# Patient Record
Sex: MALE | Race: WHITE | Employment: OTHER
[De-identification: names, ages, dates, MRNs, and addresses within clinical notes are randomized per-mention and may not be internally consistent; named-entity substitution may affect disease eponyms.]

---

## 2017-08-16 PROBLEM — I10 ESSENTIAL HYPERTENSION: Status: ACTIVE | Noted: 2017-08-16

## 2017-08-16 PROBLEM — E78.00 HYPERCHOLESTEREMIA: Status: ACTIVE | Noted: 2017-08-16

## 2019-11-11 PROBLEM — C44.310 BASAL CELL CARCINOMA (BCC) OF SKIN OF FACE: Status: ACTIVE | Noted: 2019-09-05

## 2019-11-11 PROBLEM — K21.9 GERD (GASTROESOPHAGEAL REFLUX DISEASE): Status: ACTIVE | Noted: 2019-09-05

## 2019-11-11 PROBLEM — E78.2 MIXED HYPERLIPIDEMIA: Status: ACTIVE | Noted: 2017-08-16

## 2020-11-17 PROBLEM — M17.10 ARTHRITIS OF KNEE: Status: ACTIVE | Noted: 2020-03-13

## 2020-11-17 PROBLEM — E66.01 MORBID OBESITY WITH BMI OF 45.0-49.9, ADULT (HCC): Status: ACTIVE | Noted: 2019-06-04

## 2023-06-07 ENCOUNTER — HOSPITAL ENCOUNTER (OUTPATIENT)
Facility: HOSPITAL | Age: 68
Discharge: HOME | End: 2023-06-10
Attending: OTOLARYNGOLOGY
Payer: MEDICARE

## 2023-06-07 DIAGNOSIS — K21.9 GASTROESOPHAGEAL REFLUX DISEASE WITHOUT ESOPHAGITIS: ICD-10-CM

## 2023-06-07 PROCEDURE — 74220 X-RAY XM ESOPHAGUS 1CNTRST: CPT

## 2024-03-19 ENCOUNTER — TRANSCRIBE ORDERS (OUTPATIENT)
Facility: HOSPITAL | Age: 69
End: 2024-03-19

## 2024-03-19 DIAGNOSIS — M54.16 LUMBAR RADICULOPATHY: Primary | ICD-10-CM

## 2024-04-01 ENCOUNTER — HOSPITAL ENCOUNTER (OUTPATIENT)
Facility: HOSPITAL | Age: 69
Discharge: HOME | End: 2024-04-04
Payer: MEDICARE

## 2024-04-01 DIAGNOSIS — M54.16 LUMBAR RADICULOPATHY: ICD-10-CM

## 2024-04-01 PROCEDURE — 72148 MRI LUMBAR SPINE W/O DYE: CPT

## 2024-07-10 ENCOUNTER — HOSPITAL ENCOUNTER (OUTPATIENT)
Facility: HOSPITAL | Age: 69
Discharge: HOME | End: 2024-07-13
Payer: COMMERCIAL

## 2024-07-10 VITALS
DIASTOLIC BLOOD PRESSURE: 73 MMHG | RESPIRATION RATE: 16 BRPM | OXYGEN SATURATION: 99 % | BODY MASS INDEX: 37.74 KG/M2 | WEIGHT: 249 LBS | HEART RATE: 70 BPM | SYSTOLIC BLOOD PRESSURE: 111 MMHG | HEIGHT: 68 IN

## 2024-07-10 PROBLEM — Z01.818 ENCOUNTER FOR PREADMISSION TESTING: Status: ACTIVE | Noted: 2024-07-10

## 2024-07-10 LAB
ALBUMIN SERPL-MCNC: 3.9 G/DL (ref 3.5–5)
ALBUMIN/GLOB SERPL: 1.4 (ref 1.1–2.2)
ALP SERPL-CCNC: 62 U/L (ref 45–117)
ALT SERPL-CCNC: 38 U/L (ref 12–78)
ANION GAP SERPL CALC-SCNC: 7 MMOL/L (ref 5–15)
AST SERPL-CCNC: 13 U/L (ref 15–37)
BASOPHILS # BLD: 0 K/UL (ref 0–0.1)
BASOPHILS NFR BLD: 1 % (ref 0–1)
BILIRUB SERPL-MCNC: 0.3 MG/DL (ref 0.2–1)
BUN SERPL-MCNC: 17 MG/DL (ref 6–20)
BUN/CREAT SERPL: 18 (ref 12–20)
CALCIUM SERPL-MCNC: 9.1 MG/DL (ref 8.5–10.1)
CHLORIDE SERPL-SCNC: 108 MMOL/L (ref 97–108)
CO2 SERPL-SCNC: 25 MMOL/L (ref 21–32)
CREAT SERPL-MCNC: 0.95 MG/DL (ref 0.7–1.3)
DIFFERENTIAL METHOD BLD: ABNORMAL
EKG ATRIAL RATE: 63 BPM
EKG DIAGNOSIS: NORMAL
EKG P AXIS: 35 DEGREES
EKG P-R INTERVAL: 168 MS
EKG Q-T INTERVAL: 428 MS
EKG QRS DURATION: 96 MS
EKG QTC CALCULATION (BAZETT): 437 MS
EKG R AXIS: 22 DEGREES
EKG T AXIS: 67 DEGREES
EKG VENTRICULAR RATE: 63 BPM
EOSINOPHIL # BLD: 0.2 K/UL (ref 0–0.4)
EOSINOPHIL NFR BLD: 3 % (ref 0–7)
ERYTHROCYTE [DISTWIDTH] IN BLOOD BY AUTOMATED COUNT: 12.1 % (ref 11.5–14.5)
EST. AVERAGE GLUCOSE BLD GHB EST-MCNC: 134 MG/DL
GLOBULIN SER CALC-MCNC: 2.7 G/DL (ref 2–4)
GLUCOSE SERPL-MCNC: 193 MG/DL (ref 65–100)
HBA1C MFR BLD: 6.3 % (ref 4–5.6)
HCT VFR BLD AUTO: 39.9 % (ref 36.6–50.3)
HGB BLD-MCNC: 13.9 G/DL (ref 12.1–17)
IMM GRANULOCYTES # BLD AUTO: 0 K/UL (ref 0–0.04)
IMM GRANULOCYTES NFR BLD AUTO: 0 % (ref 0–0.5)
LYMPHOCYTES # BLD: 1.6 K/UL (ref 0.8–3.5)
LYMPHOCYTES NFR BLD: 25 % (ref 12–49)
MCH RBC QN AUTO: 34.2 PG (ref 26–34)
MCHC RBC AUTO-ENTMCNC: 34.8 G/DL (ref 30–36.5)
MCV RBC AUTO: 98.3 FL (ref 80–99)
MONOCYTES # BLD: 0.4 K/UL (ref 0–1)
MONOCYTES NFR BLD: 7 % (ref 5–13)
NEUTS SEG # BLD: 4 K/UL (ref 1.8–8)
NEUTS SEG NFR BLD: 64 % (ref 32–75)
NRBC # BLD: 0 K/UL (ref 0–0.01)
NRBC BLD-RTO: 0 PER 100 WBC
PLATELET # BLD AUTO: 162 K/UL (ref 150–400)
PMV BLD AUTO: 10.9 FL (ref 8.9–12.9)
POTASSIUM SERPL-SCNC: 4.1 MMOL/L (ref 3.5–5.1)
PROT SERPL-MCNC: 6.6 G/DL (ref 6.4–8.2)
RBC # BLD AUTO: 4.06 M/UL (ref 4.1–5.7)
SODIUM SERPL-SCNC: 140 MMOL/L (ref 136–145)
WBC # BLD AUTO: 6.2 K/UL (ref 4.1–11.1)

## 2024-07-10 PROCEDURE — 83036 HEMOGLOBIN GLYCOSYLATED A1C: CPT

## 2024-07-10 PROCEDURE — APPNB30 APP NON BILLABLE TIME 0-30 MINS: Performed by: NURSE PRACTITIONER

## 2024-07-10 PROCEDURE — 93010 ELECTROCARDIOGRAM REPORT: CPT | Performed by: SPECIALIST

## 2024-07-10 PROCEDURE — 85025 COMPLETE CBC W/AUTO DIFF WBC: CPT

## 2024-07-10 PROCEDURE — 80053 COMPREHEN METABOLIC PANEL: CPT

## 2024-07-10 PROCEDURE — 93005 ELECTROCARDIOGRAM TRACING: CPT | Performed by: ORTHOPAEDIC SURGERY

## 2024-07-10 PROCEDURE — 36415 COLL VENOUS BLD VENIPUNCTURE: CPT

## 2024-07-10 RX ORDER — LOSARTAN POTASSIUM 25 MG/1
12.5 TABLET ORAL DAILY
COMMUNITY

## 2024-07-10 RX ORDER — METOPROLOL SUCCINATE 25 MG/1
25 TABLET, EXTENDED RELEASE ORAL
COMMUNITY

## 2024-07-10 RX ORDER — TADALAFIL 5 MG/1
5 TABLET ORAL
COMMUNITY

## 2024-07-10 ASSESSMENT — ENCOUNTER SYMPTOMS
BLOOD IN STOOL: 0
NAUSEA: 0
WHEEZING: 0
SORE THROAT: 0
SHORTNESS OF BREATH: 0
COUGH: 0
ABDOMINAL PAIN: 0
TROUBLE SWALLOWING: 0
VOMITING: 0

## 2024-07-10 NOTE — DISCHARGE INSTRUCTIONS
[facility]                   [address] [place] [address]   PRE-ADMISSION TESTING    [phone]     Surgery Date:  7/24/2028 Wednesday      Is surgery arrival time given by surgeon?  NO  If “NO”, [facility] staff will call you between 3 and 7pm the day before your surgery with your arrival time. (If your surgery is on a Monday, we will call you the Friday before.)    Call [phone] after 7pm Monday-Friday if you did not receive this call.    INSTRUCTIONS BEFORE YOUR SURGERY   When You  Arrive    Arrive at the 2nd Floor Admitting Desk on the day of your surgery     Have your insurance card, photo ID, and any copayment (if needed)   Food   and   Drink    NO food or drink after midnight the night before surgery       This means NO water, gum, mints, coffee, juice, etc.     No alcohol (beer, wine, liquor) 24 hours before and after surgery   Medications to TAKE Morning of Surgery      MEDICATIONS TO TAKE THE MORNING OF SURGERY WITH A SMALL SIP OF WATER:          None          Medications to STOP 7 Days Before Surgery Non-Steroidal anti-inflammatory Drugs (NSAID's): for example: Ibuprofen, Advil, Motrin, Naproxen, Aleve  Aspirin, if taking for pain  Herbal supplements, vitamins, and fish oil  Other:  (Pain medications not listed above, including Tylenol, may be taken)   Blood Thinners    If you take Aspirin, Plavix, Coumadin, or any blood-thinning or anti blood-clotting medicine, talk to the doctor who prescribed the medications for pre-operative instructions.    Bathing   Clothing  Jewelry  Valuables       If you shower the morning of surgery, please do not apply anything to your skin (lotions, powders, deodorant, or makeup, especially mascara)  Follow Chlorhexidine Care Fusion body wash instructions provided to you during PAT appointment. Begin 3 days prior to surgery.  Do not shave or trim anywhere 24 hours before surgery  Wear your hair loose or down; no pony-tails, buns,

## 2024-07-10 NOTE — H&P
encounter.     Past Medical History:   Diagnosis Date    Allergic rhinitis     Anesthesia complication     developed hypotension after meniscal surgery/drinking coke    Arthritis     Cancer (HCC)     melanoma to cheek; BCC    DDD (degenerative disc disease), lumbar     Diabetes mellitus (HCC)     well controlled on minimal medications    Hyperlipidemia     Hypertension     Kidney stone     EVELIA on CPAP     Plantar fasciitis      Past Surgical History:   Procedure Laterality Date    COLONOSCOPY  2013    HERNIA REPAIR  1998    Inguinal    HERNIA REPAIR  2012    Umbilical    KNEE SURGERY Right 2014    meniscus repair    KNEE SURGERY Right 2020    TKR revision    MOHS SURGERY      BCC forehead    SKIN CANCER EXCISION      melanoma right cheek    TONSILLECTOMY      TOTAL KNEE ARTHROPLASTY Right 2019    Dr. Chase     WISDOM TOOTH EXTRACTION       Social History     Tobacco Use    Smoking status: Former     Current packs/day: 0.00     Average packs/day: 3.0 packs/day for 18.0 years (54.0 ttl pk-yrs)     Types: Cigarettes, Cigars     Start date: 11/10/1976     Quit date: 11/10/1994     Years since quittin.6    Smokeless tobacco: Never    Tobacco comments:     Smokes 2 cigars daily   Vaping Use    Vaping Use: Never used   Substance Use Topics    Alcohol use: Not Currently     Comment: 3 drinks weekly    Drug use: Never     Family History   Adopted: Yes   Problem Relation Age of Onset    No Known Problems Son         Pre-Operative Risk Assessment Using 2014 ACC/AHA Guidelines     Emergent procedure: No  Active Cardiac Condition including decompensated HF, Arrhythmia, MI <3 weeks, severe valve disease: No  Risk Level of Procedure: intermediate risk  Revised Cardiac Risk Index Risk factors: None 3.9% risk of cardiac complications during or around noncardiac surgery (30 day risk of Death, MI or Cardiac arrest).  Measurement of Exercise Tolerance before Surgery >4 METS (climbing > 1 flight of stairs without

## 2024-07-22 ENCOUNTER — ANESTHESIA EVENT (OUTPATIENT)
Facility: HOSPITAL | Age: 69
End: 2024-07-22
Payer: MEDICARE

## 2024-07-23 NOTE — PERIOP NOTE
Hello,     You are scheduled to have surgery tomorrow at [facility].     We would like for you to arrive at  10:30 am  We are located on the second floor, [address]. You will check-in at the registration desk located outside the elevators on the second floor prior to proceeding to [address].  Remember nothing to eat or drink after midnight. If you need to take medications the morning of surgery, please take with a few sips of water.   Wear loose, comfortable clothing and leave all your jewelry at home.   You may bring your cell phone with you.  One family member will be allowed in the pre-op area once you are dressed and your IV has been started.   You will need someone to drive you home and be with you for 24 hours post-anesthesia.     We look forward to seeing you! Call [phone] for questions after hours and [phone] between 5:30AM and 6PM.     Thanks!    [facility] ASU PREOP TEAM

## 2024-07-24 ENCOUNTER — HOSPITAL ENCOUNTER (OUTPATIENT)
Facility: HOSPITAL | Age: 69
Setting detail: OUTPATIENT SURGERY
Discharge: HOME | End: 2024-07-24
Attending: ORTHOPAEDIC SURGERY | Admitting: ORTHOPAEDIC SURGERY
Payer: MEDICARE

## 2024-07-24 ENCOUNTER — ANESTHESIA (OUTPATIENT)
Facility: HOSPITAL | Age: 69
End: 2024-07-24
Payer: MEDICARE

## 2024-07-24 VITALS
DIASTOLIC BLOOD PRESSURE: 80 MMHG | SYSTOLIC BLOOD PRESSURE: 116 MMHG | WEIGHT: 250.88 LBS | OXYGEN SATURATION: 92 % | BODY MASS INDEX: 38.02 KG/M2 | HEIGHT: 68 IN | HEART RATE: 64 BPM | TEMPERATURE: 98.2 F | RESPIRATION RATE: 20 BRPM

## 2024-07-24 DIAGNOSIS — M25.552 LEFT HIP PAIN: ICD-10-CM

## 2024-07-24 DIAGNOSIS — Z87.821 H/O RETAINED FOREIGN BODY FULLY REMOVED: Primary | ICD-10-CM

## 2024-07-24 LAB
GLUCOSE BLD STRIP.AUTO-MCNC: 131 MG/DL (ref 65–117)
SERVICE CMNT-IMP: ABNORMAL

## 2024-07-24 PROCEDURE — 2580000003 HC RX 258: Performed by: ANESTHESIOLOGY

## 2024-07-24 PROCEDURE — 6360000002 HC RX W HCPCS: Performed by: ANESTHESIOLOGY

## 2024-07-24 PROCEDURE — 7100000000 HC PACU RECOVERY - FIRST 15 MIN: Performed by: ORTHOPAEDIC SURGERY

## 2024-07-24 PROCEDURE — 3700000000 HC ANESTHESIA ATTENDED CARE: Performed by: ORTHOPAEDIC SURGERY

## 2024-07-24 PROCEDURE — 82962 GLUCOSE BLOOD TEST: CPT

## 2024-07-24 PROCEDURE — 7100000010 HC PHASE II RECOVERY - FIRST 15 MIN: Performed by: ORTHOPAEDIC SURGERY

## 2024-07-24 PROCEDURE — 7100000001 HC PACU RECOVERY - ADDTL 15 MIN: Performed by: ORTHOPAEDIC SURGERY

## 2024-07-24 PROCEDURE — 6360000002 HC RX W HCPCS: Performed by: NURSE ANESTHETIST, CERTIFIED REGISTERED

## 2024-07-24 PROCEDURE — 3600000003 HC SURGERY LEVEL 3 BASE: Performed by: ORTHOPAEDIC SURGERY

## 2024-07-24 PROCEDURE — 3700000001 HC ADD 15 MINUTES (ANESTHESIA): Performed by: ORTHOPAEDIC SURGERY

## 2024-07-24 PROCEDURE — 3600000013 HC SURGERY LEVEL 3 ADDTL 15MIN: Performed by: ORTHOPAEDIC SURGERY

## 2024-07-24 PROCEDURE — 2500000003 HC RX 250 WO HCPCS: Performed by: NURSE ANESTHETIST, CERTIFIED REGISTERED

## 2024-07-24 PROCEDURE — 2709999900 HC NON-CHARGEABLE SUPPLY: Performed by: ORTHOPAEDIC SURGERY

## 2024-07-24 RX ORDER — SODIUM CHLORIDE, SODIUM LACTATE, POTASSIUM CHLORIDE, CALCIUM CHLORIDE 600; 310; 30; 20 MG/100ML; MG/100ML; MG/100ML; MG/100ML
INJECTION, SOLUTION INTRAVENOUS CONTINUOUS
Status: DISCONTINUED | OUTPATIENT
Start: 2024-07-24 | End: 2024-07-24 | Stop reason: HOSPADM

## 2024-07-24 RX ORDER — ONDANSETRON 4 MG/1
4 TABLET, ORALLY DISINTEGRATING ORAL EVERY 8 HOURS PRN
Qty: 10 TABLET | Refills: 0 | Status: SHIPPED | OUTPATIENT
Start: 2024-07-24

## 2024-07-24 RX ORDER — MIDAZOLAM HYDROCHLORIDE 2 MG/2ML
2 INJECTION, SOLUTION INTRAMUSCULAR; INTRAVENOUS
Status: DISCONTINUED | OUTPATIENT
Start: 2024-07-24 | End: 2024-07-24 | Stop reason: HOSPADM

## 2024-07-24 RX ORDER — PROPOFOL 10 MG/ML
INJECTION, EMULSION INTRAVENOUS PRN
Status: DISCONTINUED | OUTPATIENT
Start: 2024-07-24 | End: 2024-07-24 | Stop reason: SDUPTHER

## 2024-07-24 RX ORDER — HYDROCODONE BITARTRATE AND ACETAMINOPHEN 5; 325 MG/1; MG/1
1 TABLET ORAL EVERY 4 HOURS PRN
Qty: 15 TABLET | Refills: 0 | Status: SHIPPED | OUTPATIENT
Start: 2024-07-24 | End: 2024-07-31

## 2024-07-24 RX ORDER — NALOXONE HYDROCHLORIDE 0.4 MG/ML
INJECTION, SOLUTION INTRAMUSCULAR; INTRAVENOUS; SUBCUTANEOUS PRN
Status: DISCONTINUED | OUTPATIENT
Start: 2024-07-24 | End: 2024-07-24 | Stop reason: HOSPADM

## 2024-07-24 RX ORDER — DICLOFENAC SODIUM 75 MG/1
75 TABLET, DELAYED RELEASE ORAL 2 TIMES DAILY
Qty: 60 TABLET | Refills: 0 | Status: SHIPPED | OUTPATIENT
Start: 2024-07-24 | End: 2024-08-23

## 2024-07-24 RX ORDER — ASPIRIN 81 MG/1
81 TABLET ORAL 2 TIMES DAILY
Qty: 60 TABLET | Refills: 0 | Status: SHIPPED | OUTPATIENT
Start: 2024-07-24

## 2024-07-24 RX ORDER — FENTANYL CITRATE 50 UG/ML
25 INJECTION, SOLUTION INTRAMUSCULAR; INTRAVENOUS EVERY 5 MIN PRN
Status: DISCONTINUED | OUTPATIENT
Start: 2024-07-24 | End: 2024-07-24 | Stop reason: HOSPADM

## 2024-07-24 RX ORDER — FENTANYL CITRATE 50 UG/ML
100 INJECTION, SOLUTION INTRAMUSCULAR; INTRAVENOUS
Status: DISCONTINUED | OUTPATIENT
Start: 2024-07-24 | End: 2024-07-24 | Stop reason: HOSPADM

## 2024-07-24 RX ORDER — CEFAZOLIN SODIUM 1 G/3ML
INJECTION, POWDER, FOR SOLUTION INTRAMUSCULAR; INTRAVENOUS PRN
Status: DISCONTINUED | OUTPATIENT
Start: 2024-07-24 | End: 2024-07-24 | Stop reason: SDUPTHER

## 2024-07-24 RX ORDER — LIDOCAINE HYDROCHLORIDE 10 MG/ML
1 INJECTION, SOLUTION EPIDURAL; INFILTRATION; INTRACAUDAL; PERINEURAL
Status: DISCONTINUED | OUTPATIENT
Start: 2024-07-24 | End: 2024-07-24 | Stop reason: HOSPADM

## 2024-07-24 RX ORDER — OXYCODONE HCL 10 MG/1
10 TABLET, FILM COATED, EXTENDED RELEASE ORAL ONCE
Status: DISCONTINUED | OUTPATIENT
Start: 2024-07-24 | End: 2024-07-24

## 2024-07-24 RX ORDER — OXYCODONE HYDROCHLORIDE 5 MG/1
10 TABLET ORAL
Status: DISCONTINUED | OUTPATIENT
Start: 2024-07-24 | End: 2024-07-24 | Stop reason: HOSPADM

## 2024-07-24 RX ORDER — SUCCINYLCHOLINE CHLORIDE 20 MG/ML
INJECTION INTRAMUSCULAR; INTRAVENOUS PRN
Status: DISCONTINUED | OUTPATIENT
Start: 2024-07-24 | End: 2024-07-24 | Stop reason: SDUPTHER

## 2024-07-24 RX ORDER — ACETAMINOPHEN 325 MG/1
975 TABLET ORAL ONCE
Status: DISCONTINUED | OUTPATIENT
Start: 2024-07-24 | End: 2024-07-24

## 2024-07-24 RX ORDER — DIPHENHYDRAMINE HYDROCHLORIDE 50 MG/ML
12.5 INJECTION INTRAMUSCULAR; INTRAVENOUS
Status: DISCONTINUED | OUTPATIENT
Start: 2024-07-24 | End: 2024-07-24 | Stop reason: HOSPADM

## 2024-07-24 RX ORDER — PHENYLEPHRINE HCL IN 0.9% NACL 0.4MG/10ML
SYRINGE (ML) INTRAVENOUS PRN
Status: DISCONTINUED | OUTPATIENT
Start: 2024-07-24 | End: 2024-07-24 | Stop reason: SDUPTHER

## 2024-07-24 RX ORDER — ONDANSETRON 2 MG/ML
INJECTION INTRAMUSCULAR; INTRAVENOUS PRN
Status: DISCONTINUED | OUTPATIENT
Start: 2024-07-24 | End: 2024-07-24 | Stop reason: SDUPTHER

## 2024-07-24 RX ORDER — KETOROLAC TROMETHAMINE 15 MG/ML
15 INJECTION, SOLUTION INTRAMUSCULAR; INTRAVENOUS
Status: COMPLETED | OUTPATIENT
Start: 2024-07-24 | End: 2024-07-24

## 2024-07-24 RX ORDER — ROCURONIUM BROMIDE 10 MG/ML
INJECTION, SOLUTION INTRAVENOUS PRN
Status: DISCONTINUED | OUTPATIENT
Start: 2024-07-24 | End: 2024-07-24 | Stop reason: SDUPTHER

## 2024-07-24 RX ORDER — FAMOTIDINE 10 MG/ML
INJECTION, SOLUTION INTRAVENOUS PRN
Status: DISCONTINUED | OUTPATIENT
Start: 2024-07-24 | End: 2024-07-24 | Stop reason: SDUPTHER

## 2024-07-24 RX ORDER — ONDANSETRON 2 MG/ML
4 INJECTION INTRAMUSCULAR; INTRAVENOUS
Status: DISCONTINUED | OUTPATIENT
Start: 2024-07-24 | End: 2024-07-24 | Stop reason: HOSPADM

## 2024-07-24 RX ORDER — POLYETHYLENE GLYCOL 3350 17 G/17G
17 POWDER, FOR SOLUTION ORAL DAILY PRN
Qty: 14 PACKET | Refills: 0 | Status: SHIPPED | OUTPATIENT
Start: 2024-07-24 | End: 2024-08-07

## 2024-07-24 RX ORDER — CEFADROXIL 500 MG/1
500 CAPSULE ORAL 2 TIMES DAILY
Qty: 6 CAPSULE | Refills: 0 | Status: SHIPPED | OUTPATIENT
Start: 2024-07-24 | End: 2024-07-27

## 2024-07-24 RX ORDER — FENTANYL CITRATE 50 UG/ML
INJECTION, SOLUTION INTRAMUSCULAR; INTRAVENOUS PRN
Status: DISCONTINUED | OUTPATIENT
Start: 2024-07-24 | End: 2024-07-24 | Stop reason: SDUPTHER

## 2024-07-24 RX ORDER — CELECOXIB 100 MG/1
100 CAPSULE ORAL ONCE
Status: DISCONTINUED | OUTPATIENT
Start: 2024-07-24 | End: 2024-07-24

## 2024-07-24 RX ORDER — DEXMEDETOMIDINE HYDROCHLORIDE 100 UG/ML
INJECTION, SOLUTION INTRAVENOUS PRN
Status: DISCONTINUED | OUTPATIENT
Start: 2024-07-24 | End: 2024-07-24 | Stop reason: SDUPTHER

## 2024-07-24 RX ORDER — CETIRIZINE HYDROCHLORIDE 10 MG/1
10 TABLET ORAL DAILY
COMMUNITY

## 2024-07-24 RX ORDER — KETOROLAC TROMETHAMINE 30 MG/ML
INJECTION, SOLUTION INTRAMUSCULAR; INTRAVENOUS PRN
Status: DISCONTINUED | OUTPATIENT
Start: 2024-07-24 | End: 2024-07-24 | Stop reason: SDUPTHER

## 2024-07-24 RX ADMIN — PROPOFOL 40 MG: 10 INJECTION, EMULSION INTRAVENOUS at 13:27

## 2024-07-24 RX ADMIN — PROPOFOL 200 MG: 10 INJECTION, EMULSION INTRAVENOUS at 12:36

## 2024-07-24 RX ADMIN — FAMOTIDINE 20 MG: 10 INJECTION INTRAVENOUS at 12:29

## 2024-07-24 RX ADMIN — PROPOFOL 50 MG: 10 INJECTION, EMULSION INTRAVENOUS at 13:24

## 2024-07-24 RX ADMIN — FENTANYL CITRATE 50 MCG: 50 INJECTION, SOLUTION INTRAMUSCULAR; INTRAVENOUS at 12:54

## 2024-07-24 RX ADMIN — ROCURONIUM BROMIDE 10 MG: 10 INJECTION INTRAVENOUS at 12:37

## 2024-07-24 RX ADMIN — SODIUM CHLORIDE, POTASSIUM CHLORIDE, SODIUM LACTATE AND CALCIUM CHLORIDE: 600; 310; 30; 20 INJECTION, SOLUTION INTRAVENOUS at 12:28

## 2024-07-24 RX ADMIN — PROPOFOL 30 MG: 10 INJECTION, EMULSION INTRAVENOUS at 13:29

## 2024-07-24 RX ADMIN — KETOROLAC TROMETHAMINE 15 MG: 15 INJECTION, SOLUTION INTRAMUSCULAR; INTRAVENOUS at 14:18

## 2024-07-24 RX ADMIN — Medication 40 MCG: at 13:01

## 2024-07-24 RX ADMIN — CEFAZOLIN SODIUM 2 G: 1 POWDER, FOR SOLUTION INTRAMUSCULAR; INTRAVENOUS at 12:42

## 2024-07-24 RX ADMIN — FENTANYL CITRATE 100 MCG: 50 INJECTION, SOLUTION INTRAMUSCULAR; INTRAVENOUS at 12:36

## 2024-07-24 RX ADMIN — PROPOFOL 50 MG: 10 INJECTION, EMULSION INTRAVENOUS at 13:26

## 2024-07-24 RX ADMIN — SUCCINYLCHOLINE CHLORIDE 160 MG: 20 INJECTION, SOLUTION INTRAMUSCULAR; INTRAVENOUS at 12:38

## 2024-07-24 RX ADMIN — ONDANSETRON HYDROCHLORIDE 4 MG: 2 SOLUTION INTRAMUSCULAR; INTRAVENOUS at 13:14

## 2024-07-24 RX ADMIN — LIDOCAINE HYDROCHLORIDE 100 MG: 20 INJECTION, SOLUTION INFILTRATION; PERINEURAL at 12:36

## 2024-07-24 RX ADMIN — KETOROLAC TROMETHAMINE 30 MG: 30 INJECTION, SOLUTION INTRAMUSCULAR; INTRAVENOUS at 13:14

## 2024-07-24 RX ADMIN — DEXMEDETOMIDINE 4 MCG: 100 INJECTION, SOLUTION INTRAVENOUS at 13:20

## 2024-07-24 RX ADMIN — DEXMEDETOMIDINE 4 MCG: 100 INJECTION, SOLUTION INTRAVENOUS at 13:22

## 2024-07-24 NOTE — H&P
Orthopaedic PRE-OP Admission History and Physical        Subjective:   Patient is a 69 y.o.  male who presented for right knee pain.  The patient was evaluated and determined the most appropriate plan of care is to proceed with surgical intervention. Conservative measures were not indicated or successful.         Patient Active Problem List    Diagnosis Date Noted    Encounter for preadmission testing 07/10/2024    Arthritis of knee 03/13/2020    Basal cell carcinoma (BCC) of skin of face 09/05/2019    GERD (gastroesophageal reflux disease) 09/05/2019    Morbid obesity with BMI of 45.0-49.9, adult (HCC) 06/04/2019    Essential hypertension 08/16/2017    Mixed hyperlipidemia 08/16/2017    Groin pain 03/05/2014     Past Medical History:   Diagnosis Date    Allergic rhinitis     Anesthesia complication     developed hypotension after meniscal surgery/drinking coke    Arthritis     Cancer (HCC)     melanoma to cheek; BCC    DDD (degenerative disc disease), lumbar     Diabetes mellitus (HCC)     well controlled on minimal medications    Hyperlipidemia     Hypertension     Kidney stone     EVELIA on CPAP     Plantar fasciitis       Past Surgical History:   Procedure Laterality Date    COLONOSCOPY  2013    HERNIA REPAIR  1998    Inguinal    HERNIA REPAIR  2012    Umbilical    KNEE SURGERY Right 2014    meniscus repair    KNEE SURGERY Right 03/2020    TKR revision    MOHS SURGERY      BCC forehead    SKIN CANCER EXCISION      melanoma right cheek    TONSILLECTOMY      TOTAL KNEE ARTHROPLASTY Right 09/16/2019    Dr. Chase     WISDOM TOOTH EXTRACTION        Prior to Admission medications    Medication Sig Start Date End Date Taking? Authorizing Provider   metoprolol succinate (TOPROL XL) 25 MG extended release tablet Take 1 tablet by mouth nightly    Aneudy Ty MD   losartan (COZAAR) 25 MG tablet Take 0.5 tablets by mouth daily    Aneudy Ty MD   tadalafil (CIALIS) 5 MG tablet Take 1 tablet by mouth

## 2024-07-24 NOTE — ANESTHESIA PRE PROCEDURE
Morbid obesity with BMI of 45.0-49.9, adult ([facility]) E66.01, Z68.42    Encounter for preadmission testing Z01.818       Past Medical History:        Diagnosis Date    Allergic rhinitis     Anesthesia complication     developed hypotension after meniscal surgery/drinking coke    Arthritis     Cancer (HCC)     melanoma to cheek; BCC    DDD (degenerative disc disease), lumbar     Diabetes mellitus (HCC)     well controlled on minimal medications    Hyperlipidemia     Hypertension     Kidney stone     EVELIA on CPAP     Plantar fasciitis        Past Surgical History:        Procedure Laterality Date    COLONOSCOPY  2013    HERNIA REPAIR  1998    Inguinal    HERNIA REPAIR  2012    Umbilical    KNEE SURGERY Right 2014    meniscus repair    KNEE SURGERY Right 2020    TKR revision    MOHS SURGERY      BCC forehead    SKIN CANCER EXCISION      melanoma right cheek    TONSILLECTOMY      TOTAL KNEE ARTHROPLASTY Right 2019    Dr. Chase     WISDOM TOOTH EXTRACTION         Social History:    Social History     Tobacco Use    Smoking status: Former     Current packs/day: 0.00     Average packs/day: 3.0 packs/day for 18.0 years (54.0 ttl pk-yrs)     Types: Cigarettes, Cigars     Start date: 11/10/1976     Quit date: 11/10/1994     Years since quittin.7    Smokeless tobacco: Never    Tobacco comments:     Smokes 2 cigars daily   Substance Use Topics    Alcohol use: Not Currently     Alcohol/week: 3.0 standard drinks of alcohol     Types: 3 Standard drinks or equivalent per week     Comment: 3 drinks weekly                                Counseling given: Not Answered  Tobacco comments: Smokes 2 cigars daily      Vital Signs (Current): There were no vitals filed for this visit.                                           BP Readings from Last 3 Encounters:   07/10/24 111/73   20 131/83   20 116/74       NPO Status:                                                                                 BMI:   Wt Readings

## 2024-07-24 NOTE — BRIEF OP NOTE
Brief Postoperative Note      Patient: Ross Desai  YOB: 1955  MRN: [id number]    Date of Procedure: 7/24/2024    Pre-Op Diagnosis Codes:     * Superficial foreign body of right knee without infection, initial encounter [S80.251A, L08.9]    Post-Op Diagnosis: Same       Procedure(s):  FOREIGN BODY REMOVAL RIGHT KNEE    Surgeon(s):  Joe Narayan MD    Assistant:  Surgical Assistant: Deb Thomas  Physician Assistant: Lopez Birmingham PA-C    Anesthesia: General    Estimated Blood Loss (mL): less than 50     Complications: None    Specimens:   * No specimens in log *    Implants:  * No implants in log *      Drains: * No LDAs found *    Findings:  Infection Present At Time Of Surgery (PATOS) (choose all levels that have infection present):  No infection present  Other Findings: multiple nonabsorbable sutures in the arthrotomy line    Electronically signed by Joe Narayan MD on 7/24/2024 at 1:15 PM

## 2024-07-24 NOTE — ANESTHESIA POSTPROCEDURE EVALUATION
Department of Anesthesiology  Postprocedure Note    Patient: Ross Desai  MRN: [id number]  YOB: 1955  Date of evaluation: 7/24/2024    Procedure Summary       Date: 07/24/24 Room / Location: [facility] ASU OR [address] AMBULATORY OR    Anesthesia Start: 1228 Anesthesia Stop: 1346    Procedure: FOREIGN BODY REMOVAL RIGHT KNEE (Right: Knee) Diagnosis:       Superficial foreign body of right knee with infection, initial encounter      (Superficial foreign body of right knee with infection, initial encounter [S80.251A, L08.9])    Surgeons: Joe Narayan MD Responsible Provider:     Anesthesia Type: General ASA Status: 3            Anesthesia Type: General    Candy Phase I: Candy Score: 8    Candy Phase II:      Anesthesia Post Evaluation    Patient location during evaluation: PACU  Patient participation: complete - patient participated  Level of consciousness: awake  Pain score: 0  Airway patency: patent  Nausea & Vomiting: no nausea and no vomiting  Cardiovascular status: blood pressure returned to baseline  Respiratory status: acceptable  Hydration status: euvolemic  Multimodal analgesia pain management approach  Pain management: adequate    No notable events documented.

## 2024-07-24 NOTE — OP NOTE
marked. The patient was then taken back to the operating room where the anesthetic of choice was administered. The patient was positioned on the operating room table taking care to pad all bony prominences. The operative site was prepped and draped in the usual sterile fashion. Appropriate antibiotics were administered and timeouts were performed in keeping with guidelines. Previous scar was incised and skin and subcutaneous tissue was dissected.  Full-thickness flaps were developed to the arthrotomy line.  Multiple nonabsorbable stitches were found along the previous arthrotomy line.  These were then suctioned with a knife and removed in their entirety.  A total of approximately 7-10 nonabsorbable stitches that appear to be #5 Ethibond sutures were removed.  We continued palpating throughout the wound but could not find any further stitches.  We irrigated the wound in its entirety.  We injected the subcutaneous tissue with local anesthetic.  We then set about with closure.  We used inverted 2-0 Vicryl stitches for the subcutaneous tissue and a running 3-0 Monocryl for the skin.  A sterile bandage was applied.. All counts were correct at the conclusion of the case.     DISPOSITION: Stable to PACU.    POSTOPERATIVE PLAN: The patient will leave his bandage on until he follows up.  He will mobilize after his postanesthesia care unit stay.  Will plan to discharge him home later today.    FOLLOW UP: We will plan to see the patient back in 2-3 weeks in clinic for routine checkup.     Joe Narayan MD

## 2024-07-24 NOTE — DISCHARGE INSTRUCTIONS
surgery, then at the 6 week after surgery yosef. You may see my physician assistant at the 2 week visit. Your follow up may already be set up by the time you have the surgery, or may be set up shortly after you leave the hospital.     When to Contact Our Office :  When possible, if you have questions or concerns after your surgery, it is preferable to deal with these through our office (instead of an urgent care or emergency department), as we are more familiar with your care and treatment plan. Call our office if:      You have questions about your medications  You have concerns about your wound or dressing  You need to change an appointment  You have questions or concerns about your physical therapy  You bekah a refill on your medications. Please remember to leave adequate time to allow for a refill, particularly for pain medications. Nights and weekends in particular are difficult to obtain a refill.  You have a fever >101.5  You are having pain that is poorly controlled  You are having nausea, vomiting, or other symptoms that are minor   You are having a reaction to your medications (aside from facial swelling or trouble breathing, which is an emergency)    **Our office number is [phone]; the extension to leave a voicemail for our team is [phone]. If you are unable to speak with someone, please leave a message.**    **If you need to reach someone after hours (weekends or after 5PM on weekdays), please call [phone] and follow the prompts to reach the on call provider**    When to Go to the Emergency Department or Seek Emergency Care:  You have chest pain  You are having difficulty breathing  You are having difficulty swallowing, or feel your face or throat are swelling  You have weakness on one side of your body, have difficulty seeing, facial droop, or other signs of a stroke

## 2024-07-30 ENCOUNTER — HOSPITAL ENCOUNTER (EMERGENCY)
Facility: HOSPITAL | Age: 69
Discharge: HOME | End: 2024-07-30
Attending: EMERGENCY MEDICINE
Payer: MEDICARE

## 2024-07-30 VITALS
OXYGEN SATURATION: 96 % | BODY MASS INDEX: 36.53 KG/M2 | TEMPERATURE: 97.5 F | HEART RATE: 81 BPM | HEIGHT: 68 IN | RESPIRATION RATE: 18 BRPM | DIASTOLIC BLOOD PRESSURE: 78 MMHG | WEIGHT: 241 LBS | SYSTOLIC BLOOD PRESSURE: 120 MMHG
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
## 2024-07-30 DIAGNOSIS — S80.861A TICK BITE OF RIGHT LOWER LEG, INITIAL ENCOUNTER: ICD-10-CM
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
## 2024-07-30 DIAGNOSIS — S80.862A TICK BITE OF LEFT LOWER LEG, INITIAL ENCOUNTER: Primary | ICD-10-CM

## 2024-07-30 PROCEDURE — 99283 EMERGENCY DEPT VISIT LOW MDM: CPT

## 2024-07-30 RX ORDER — DOXYCYCLINE HYCLATE 100 MG
100 TABLET ORAL 2 TIMES DAILY
Qty: 42 TABLET | Refills: 0 | Status: SHIPPED | OUTPATIENT
Start: 2024-07-30 | End: 2024-08-20

## 2024-07-30 RX ORDER — DOXYCYCLINE HYCLATE 100 MG
100 TABLET ORAL 2 TIMES DAILY
Qty: 42 TABLET | Refills: 0 | Status: SHIPPED | OUTPATIENT
Start: 2024-07-30 | End: 2024-07-30

## 2024-07-30 NOTE — ED PROVIDER NOTES
[facility] EMERGENCY DEPT  EMERGENCY DEPARTMENT ENCOUNTER      Pt Name: Ross Desai  MRN: [id number]  Birthdate 1955  Date of evaluation: 7/30/2024  Provider: JAMIE Marshall - NP      HISTORY OF PRESENT ILLNESS      HPI        Nursing Notes were reviewed.    REVIEW OF SYSTEMS         Review of Systems        PAST MEDICAL HISTORY     Past Medical History:   Diagnosis Date    Allergic rhinitis     Anesthesia complication     developed hypotension after meniscal surgery/drinking coke    Arthritis     Cancer (HCC)     melanoma to cheek; BCC    DDD (degenerative disc disease), lumbar     Diabetes mellitus (HCC)     well controlled on minimal medications    Hyperlipidemia     Hypertension     Kidney stone     EVELIA on CPAP     Plantar fasciitis          SURGICAL HISTORY       Past Surgical History:   Procedure Laterality Date    COLONOSCOPY  2013    HERNIA REPAIR  1998    Inguinal    HERNIA REPAIR  2012    Umbilical    KNEE SURGERY Right 2014    meniscus repair    KNEE SURGERY Right 03/2020    TKR revision    LEG SURGERY Right 7/24/2024    FOREIGN BODY REMOVAL RIGHT KNEE performed by Joe Narayan MD at [facility] AMBULATORY OR    MOHS SURGERY      BCC forehead    SKIN CANCER EXCISION      melanoma right cheek    TONSILLECTOMY      TOTAL KNEE ARTHROPLASTY Right 09/16/2019    Dr. Chase     TOTAL KNEE ARTHROPLASTY  03/2020    revision of rt knee in [place]    WISDOM TOOTH EXTRACTION           CURRENT MEDICATIONS       Previous Medications    ASPIRIN (ASPIRIN 81) 81 MG EC TABLET    Take 1 tablet by mouth 2 times daily    ATORVASTATIN (LIPITOR) 10 MG TABLET    Take 1 tablet by mouth nightly    CETIRIZINE (ZYRTEC) 10 MG TABLET    Take 1 tablet by mouth daily    DICLOFENAC (VOLTAREN) 75 MG EC TABLET    Take 1 tablet by mouth 2 times daily    HYDROCODONE-ACETAMINOPHEN (NORCO) 5-325 MG PER TABLET    Take 1 tablet by mouth every 4 hours as needed for Pain for up to 7 days. Intended supply: 7 days. Take lowest dose possible to manage

## 2024-07-30 NOTE — ED TRIAGE NOTES
Patient arrives to the ED for complaints of a rash to his legs which he noticed several days ago. There are 2 discrete spots on the left leg and 1 on the right.    Patient has been outside to a large degree in the past week. Has noticed several ticks/bites.     PMH Diabetes, HTN, Arthritis.

## 2024-08-12 ASSESSMENT — ENCOUNTER SYMPTOMS
SINUS PRESSURE: 0
TROUBLE SWALLOWING: 0
COLOR CHANGE: 1
SINUS PAIN: 0
EYE PAIN: 0
SHORTNESS OF BREATH: 0
ABDOMINAL DISTENTION: 0
EYE REDNESS: 0

## 2025-01-10 ENCOUNTER — HOSPITAL ENCOUNTER (OUTPATIENT)
Facility: HOSPITAL | Age: 70
Discharge: HOME | End: 2025-01-13
Attending: NEUROLOGICAL SURGERY
Payer: MEDICARE

## 2025-01-10 DIAGNOSIS — M54.16 LUMBAR RADICULOPATHY: ICD-10-CM

## 2025-01-10 PROCEDURE — 72148 MRI LUMBAR SPINE W/O DYE: CPT

## 2025-02-05 ENCOUNTER — HOSPITAL ENCOUNTER (OUTPATIENT)
Facility: HOSPITAL | Age: 70
Setting detail: OUTPATIENT SURGERY
Discharge: HOME | End: 2025-02-05
Attending: INTERNAL MEDICINE | Admitting: INTERNAL MEDICINE
Payer: MEDICARE

## 2025-02-05 ENCOUNTER — ANESTHESIA (OUTPATIENT)
Facility: HOSPITAL | Age: 70
End: 2025-02-05
Payer: MEDICARE

## 2025-02-05 ENCOUNTER — ANESTHESIA EVENT (OUTPATIENT)
Facility: HOSPITAL | Age: 70
End: 2025-02-05
Payer: MEDICARE

## 2025-02-05 VITALS
HEART RATE: 83 BPM | SYSTOLIC BLOOD PRESSURE: 110 MMHG | BODY MASS INDEX: 39.55 KG/M2 | WEIGHT: 252 LBS | HEIGHT: 67 IN | DIASTOLIC BLOOD PRESSURE: 63 MMHG | OXYGEN SATURATION: 97 % | RESPIRATION RATE: 17 BRPM | TEMPERATURE: 97.7 F

## 2025-02-05 PROCEDURE — 2709999900 HC NON-CHARGEABLE SUPPLY: Performed by: INTERNAL MEDICINE

## 2025-02-05 PROCEDURE — 6360000002 HC RX W HCPCS: Performed by: NURSE ANESTHETIST, CERTIFIED REGISTERED

## 2025-02-05 PROCEDURE — 7100000010 HC PHASE II RECOVERY - FIRST 15 MIN: Performed by: INTERNAL MEDICINE

## 2025-02-05 PROCEDURE — 3700000000 HC ANESTHESIA ATTENDED CARE: Performed by: INTERNAL MEDICINE

## 2025-02-05 PROCEDURE — 2720000010 HC SURG SUPPLY STERILE: Performed by: INTERNAL MEDICINE

## 2025-02-05 PROCEDURE — 2580000003 HC RX 258: Performed by: NURSE ANESTHETIST, CERTIFIED REGISTERED

## 2025-02-05 PROCEDURE — 3600007502: Performed by: INTERNAL MEDICINE

## 2025-02-05 PROCEDURE — 7100000011 HC PHASE II RECOVERY - ADDTL 15 MIN: Performed by: INTERNAL MEDICINE

## 2025-02-05 PROCEDURE — 88305 TISSUE EXAM BY PATHOLOGIST: CPT

## 2025-02-05 RX ORDER — LIDOCAINE HYDROCHLORIDE 20 MG/ML
INJECTION, SOLUTION EPIDURAL; INFILTRATION; INTRACAUDAL; PERINEURAL
Status: DISCONTINUED | OUTPATIENT
Start: 2025-02-05 | End: 2025-02-05 | Stop reason: SDUPTHER

## 2025-02-05 RX ORDER — SODIUM CHLORIDE 9 MG/ML
INJECTION, SOLUTION INTRAVENOUS CONTINUOUS
Status: DISCONTINUED | OUTPATIENT
Start: 2025-02-05 | End: 2025-02-05 | Stop reason: HOSPADM

## 2025-02-05 RX ORDER — SODIUM CHLORIDE 9 MG/ML
INJECTION, SOLUTION INTRAVENOUS
Status: DISCONTINUED | OUTPATIENT
Start: 2025-02-05 | End: 2025-02-05 | Stop reason: SDUPTHER

## 2025-02-05 RX ORDER — TIRZEPATIDE 15 MG/.5ML
INJECTION, SOLUTION SUBCUTANEOUS
COMMUNITY

## 2025-02-05 RX ADMIN — LIDOCAINE HYDROCHLORIDE 100 MG: 20 INJECTION, SOLUTION EPIDURAL; INFILTRATION; INTRACAUDAL; PERINEURAL at 10:03

## 2025-02-05 RX ADMIN — SODIUM CHLORIDE: 9 INJECTION, SOLUTION INTRAVENOUS at 09:56

## 2025-02-05 RX ADMIN — PROPOFOL 50 MG: 10 INJECTION, EMULSION INTRAVENOUS at 10:06

## 2025-02-05 RX ADMIN — PROPOFOL 200 MG: 10 INJECTION, EMULSION INTRAVENOUS at 10:05

## 2025-02-05 ASSESSMENT — PAIN - FUNCTIONAL ASSESSMENT: PAIN_FUNCTIONAL_ASSESSMENT: NONE - DENIES PAIN

## 2025-02-05 NOTE — PERIOP NOTE
Initial RN admission and assessment performed and documented in Endoscopy navigator.     Patient evaluated by anesthesia in pre-procedure holding.     All procedural vital signs, airway assessment, and level of consciousness information monitored and recorded by anesthesia staff on the anesthesia record.     Report received from CRNA post procedure.  Patient transported to recovery area by RN.    Endoscopy post procedure time out was performed and specimens were verified with physician.    Endoscope was pre-cleaned at bedside immediately following procedure by ODESSA Cordon

## 2025-02-05 NOTE — ANESTHESIA PRE PROCEDURE
Department of Anesthesiology  Preprocedure Note       Name:  Ross Desai   Age:  70 y.o.  :  1955                                          MRN:  [id number]         Date:  2025      Surgeon: Surgeon(s):  Tarun Oneal MD    Procedure: Procedure(s):  ESOPHAGOGASTRODUODENOSCOPY    Medications prior to admission:   Prior to Admission medications    Medication Sig Start Date End Date Taking? Authorizing Provider   cetirizine (ZYRTEC) 10 MG tablet Take 1 tablet by mouth daily    Aneudy Ty MD   aspirin (ASPIRIN 81) 81 MG EC tablet Take 1 tablet by mouth 2 times daily 24   Lopez Birmingham PA-C   ondansetron (ZOFRAN-ODT) 4 MG disintegrating tablet Take 1 tablet by mouth every 8 hours as needed for Nausea or Vomiting 24   Lopez Birmingham PA-C   diclofenac (VOLTAREN) 75 MG EC tablet Take 1 tablet by mouth 2 times daily 24  Lopez Birmingham PA-C   metoprolol succinate (TOPROL XL) 25 MG extended release tablet Take 1 tablet by mouth nightly    Aneudy Ty MD   losartan (COZAAR) 25 MG tablet Take 0.5 tablets by mouth daily    Aneudy Ty MD   tadalafil (CIALIS) 5 MG tablet Take 1 tablet by mouth nightly    Aneudy Ty MD   PREDNISONE PO Take 2.5-5 mg by mouth daily Taper    Aneudy Ty MD   NONFORMULARY Take by mouth Vitamins and Supplements:  Magnesium: 500mg daily  Vitamin C: 1000mg daily  Vitamin D3: 125mcg BID  Zinc: 50mg BID  Biotin: 5000mcg BID  Multivitamin: 1 tab daily  Iron: 1 tab daily  Vitamin B12: 1 tab daily    Aneudy Ty MD   metFORMIN (GLUCOPHAGE) 500 MG tablet Take 0.5 tablets by mouth daily    Aneudy Ty MD   atorvastatin (LIPITOR) 10 MG tablet Take 1 tablet by mouth nightly 20   Maira Rees MD       Current medications:    Current Facility-Administered Medications   Medication Dose Route Frequency Provider Last Rate Last Admin   • 0.9 % sodium chloride infusion   IntraVENous Continuous

## 2025-02-05 NOTE — H&P
Allergies   Allergen Reactions    Augmentin [Amoxicillin-Pot Clavulanate] Nausea And Vomiting    Codeine Nausea And Vomiting     Can tolerate codeine derivatives     Family History   Adopted: Yes   Problem Relation Age of Onset    No Known Problems Son      Current Facility-Administered Medications   Medication Dose Route Frequency    0.9 % sodium chloride infusion   IntraVENous Continuous         PHYSICAL EXAM:  General: WD, WN. Alert, cooperative, no acute distress    HEENT: NC, Atraumatic.  PERRLA, EOMI. Anicteric sclerae.  Lungs:  CTA Bilaterally. No Wheezing/Rhonchi/Rales.  Heart:  Regular  rhythm,  No murmur, No Rubs, No Gallops  Abdomen: Soft, Non distended, Non tender.  +Bowel sounds, no HSM  Extremities: No c/c/e  Neurologic:  CN 2-12 gi, Alert and oriented X 3.  No acute neurological distress   Psych:   Good insight. Not anxious nor agitated.           Assessment:   I have reviewed with the patient +/- family alternatives,benefits and risks for the procedure, as well as potential complications(with emphasis on, but not limited to, bleeding, perforation, cardiovascular/cerebrovascular/pulmonary events, reactions to the medications, infection, risk of missing a lesions/a cancer, and the imponderables including death), alternative options, and patient/family voices understanding.      Plan:   Endoscopic procedure  Conscious sedation or MAC

## 2025-02-05 NOTE — OP NOTE
[facility]  [address], [place] [address]                Endoscopic Gastroduodenoscopy Procedure Note    Ross Desai  1955  [id number]    Indication:  Pre-bariatric surgery evaluation      : Tarun Oneal MD    Assistants: None    Referring Provider:  Abigail Vanegas DO    Anesthesia/Sedation:  MAC anesthesia Propofol      Airway assessment: No airway problems anticipated    Pre-Procedural Exam:  Airway: clear, no airway problems anticipated  Heart: RRR, without gallops or rubs  Lungs: clear bilaterally without wheezes, crackles, or rhonchi  Abdomen: soft, nontender, nondistended, bowel sounds present  Mental Status: awake, alert and oriented to person, place and time        Procedure Details   After infomed consent was obtained for the procedure, with all risks and benefits of procedure explained the patient was taken to the endoscopy suite and placed in the left lateral decubitus position.  Following sequential administration of sedation as per above, the endoscope was inserted into the mouth and advanced under direct vision to second portion of the duodenum.  A careful inspection was made as the gastroscope was withdrawn, including a retroflexed view of the proximal stomach; findings and interventions are described below.      Findings:   Esophagus:normal  Stomach: Mild gastropathy noted in the antrum.  Biopsies taken for histology and rule out H. pylori.                   Rest of the examined stomach was unremarkable  Duodenum/jejunum: normal    Therapies:  biopsy of stomach antrum    Specimens: As above    Implants: None           Complications:   None; patient tolerated the procedure well.    EBL:  None.           Impression:    Mild erosive gastropathy                          Otherwise normal upper GI endoscopy exam    Recommendations:  -Acid suppression with a proton pump inhibitor for 2 weeks.  Okay to use OTC omeprazole 20 mg daily  -GERD diet:

## 2025-02-05 NOTE — ANESTHESIA POSTPROCEDURE EVALUATION
Department of Anesthesiology  Postprocedure Note    Patient: Ross Desai  MRN: [id number]  YOB: 1955  Date of evaluation: 2/5/2025    Procedure Summary       Date: 02/05/25 Room / Location: [facility] ENDO 04 / [facility] ENDOSCOPY    Anesthesia Start: 0957 Anesthesia Stop: 1015    Procedure: ESOPHAGOGASTRODUODENOSCOPY (Upper GI Region) Diagnosis:       Morbid obesity      Pre-operative examination      Anemia, unspecified type      Protein-calorie malnutrition, unspecified severity (HCC)      Fatigue, unspecified type      Diabetes mellitus screening      BMI 39.0-39.9,adult      (Morbid obesity [E66.01])      (Pre-operative examination [Z01.818])      (Anemia, unspecified type [D64.9])      (Protein-calorie malnutrition, unspecified severity (HCC) [E46])      (Fatigue, unspecified type [R53.83])      (Diabetes mellitus screening [Z13.1])      (BMI 39.0-39.9,adult [Z68.39])    Surgeons: Tarun Oneal MD Responsible Provider: Ayush Martin MD    Anesthesia Type: MAC ASA Status: 2            Anesthesia Type: MAC    Candy Phase I: Candy Score: 10    Candy Phase II: Candy Score: 9    Anesthesia Post Evaluation    Patient location during evaluation: bedside  Nausea & Vomiting: no nausea  Cardiovascular status: blood pressure returned to baseline  Respiratory status: acceptable  Hydration status: euvolemic    There were no known notable events for this encounter.

## 2025-02-05 NOTE — DISCHARGE INSTRUCTIONS
[facility]  Tarun Oneal M.D.  [phone]           2025  Ross Desai  :  1955  [facility]:  [id number]        ENDOSCOPY FINDINGS:   Your endoscopy showed mile erosive gastropathy.      EGD DISCHARGE INSTRUCTIONS    DISCOMFORT:  Sore throat- throat lozenges or warm salt water gargle  redness at IV site- apply warm compress to area; if redness or soreness persist- contact your physician  Gaseous discomfort- walking, belching will help relieve any discomfort  You may not operate a vehicle for 12 hours  You may not engage in an occupation involving machinery or appliances for rest of today  You may not drink alcoholic beverages for at least 12 hours  Avoid making any critical decisions for at least 24 hour    DIET:   You may resume your regular diet.    ACTIVITY  Spend the remainder of the day resting -  avoid any strenuous activity. Avoid driving or operating machinery.    CALL M.D.  ANY SIGN OF   Increasing pain, nausea, vomiting  Abdominal distension (swelling)  New increased bleeding (oral or rectal)  Fever (chills)  Pain in chest area  Bloody discharge from nose or mouth  Shortness of breath    Follow-up Instructions:   Call Dr. Oneal for any questions or problems. Telephone # [phone]  If biopsies were obtained, the results will be available  in  5 to 7 days. We will call you to notify you of these results.   Continue same medications. Follow up in the office.

## 2025-05-08 ENCOUNTER — TRANSCRIBE ORDERS (OUTPATIENT)
Facility: HOSPITAL | Age: 70
End: 2025-05-08

## 2025-05-08 DIAGNOSIS — M54.16 LUMBAR RADICULOPATHY: Primary | ICD-10-CM

## 2025-05-16 ENCOUNTER — HOSPITAL ENCOUNTER (OUTPATIENT)
Facility: HOSPITAL | Age: 70
Discharge: HOME | End: 2025-05-19
Attending: NEUROLOGICAL SURGERY
Payer: MEDICARE

## 2025-05-16 VITALS — BODY MASS INDEX: 39.47 KG/M2 | WEIGHT: 252 LBS

## 2025-05-16 DIAGNOSIS — M54.16 LUMBAR RADICULOPATHY: ICD-10-CM

## 2025-05-16 PROCEDURE — 6360000004 HC RX CONTRAST MEDICATION: Performed by: RADIOLOGY

## 2025-05-16 PROCEDURE — A9579 GAD-BASE MR CONTRAST NOS,1ML: HCPCS | Performed by: RADIOLOGY

## 2025-05-16 PROCEDURE — 72158 MRI LUMBAR SPINE W/O & W/DYE: CPT

## 2025-05-16 RX ADMIN — GADOTERIDOL 20 ML: 279.3 INJECTION, SOLUTION INTRAVENOUS at 19:55

## (undated) DEVICE — FORCEPS BX L240CM JAW DIA2.8MM L CAP W/ NDL MIC MESH TOOTH

## (undated) DEVICE — DRAPE,U/ SHT,SPLIT,PLAS,STERIL: Brand: MEDLINE

## (undated) DEVICE — BANDAGE COMPR W4INXL5YD WHT BGE POLY COT M E WRP WV HK AND

## (undated) DEVICE — GLOVE ORTHO 8   MSG9480

## (undated) DEVICE — STOCKINETTE,IMPERVIOUS,12X48,STERILE: Brand: MEDLINE

## (undated) DEVICE — 3M™ STERI-DRAPE™ U-DRAPE 1015: Brand: STERI-DRAPE™

## (undated) DEVICE — SPONGE GZ W4XL4IN COT 12 PLY TYP VII WVN C FLD DSGN STERILE

## (undated) DEVICE — BANDAGE COMPR W6INXL12FT SMOOTH FOR LIMB EXSANG ESMARCH

## (undated) DEVICE — SOLUTION SCRB 1% POVIDONE IOD BRN ANTIMIC SKIN CLN

## (undated) DEVICE — GLOVE SURG SZ 8 L12IN FNGR THK79MIL GRN LTX FREE

## (undated) DEVICE — ORISE PROKNIFE 3.0 MM ELECTRODE: Brand: ORISE™ PROKNIFE

## (undated) DEVICE — GLOVE ORANGE PI 7 1/2   MSG9075

## (undated) DEVICE — ORISE PROKNIFE 1.5 MM ELECTRODE: Brand: ORISE™ PROKNIFE

## (undated) DEVICE — CYSTO/BLADDER IRRIGATION SET, REGULATING CLAMP

## (undated) DEVICE — 3M™ IOBAN™ 2 ANTIMICROBIAL INCISE DRAPE 6650EZ: Brand: IOBAN™ 2

## (undated) DEVICE — DRAPE,EXTREMITY,89X128,STERILE: Brand: MEDLINE

## (undated) DEVICE — BASIC GENERAL-SFMC: Brand: MEDLINE INDUSTRIES, INC.

## (undated) DEVICE — ZIMMER® STERILE DISPOSABLE TOURNIQUET CUFF WITH PROTECTIVE SLEEVE AND PLC, DUAL PORT, SINGLE BLADDER, 34 IN. (86 CM)

## (undated) DEVICE — BANDAGE COBAN 6 IN WND 6INX5YD FOAM

## (undated) DEVICE — GOWN,SIRUS,NONRNF,SETINSLV,2XL,18/CS: Brand: MEDLINE

## (undated) DEVICE — SOLUTION IRRIG 3000ML 0.9% SOD CHL USP UROMATIC PLAS CONT

## (undated) DEVICE — DRESSING FOAM 4X8IN DISP POSTOP MEPILEX BORD AG

## (undated) DEVICE — SOLUTION IRRIG 1000ML STRL H2O USP PLAS POUR BTL

## (undated) DEVICE — GLOVE ORTHO 7 1/2   MSG9475

## (undated) DEVICE — DRAPE,REIN 53X77,STERILE: Brand: MEDLINE

## (undated) DEVICE — 450 ML BOTTLE OF 0.05% CHLORHEXIDINE GLUCONATE IN 99.95% STERILE WATER FOR IRRIGATION, USP AND APPLICATOR.: Brand: IRRISEPT ANTIMICROBIAL WOUND LAVAGE

## (undated) DEVICE — Device: Brand: JELCO